# Patient Record
Sex: FEMALE | URBAN - METROPOLITAN AREA
[De-identification: names, ages, dates, MRNs, and addresses within clinical notes are randomized per-mention and may not be internally consistent; named-entity substitution may affect disease eponyms.]

---

## 2017-06-24 ENCOUNTER — EMERGENCY (EMERGENCY)
Facility: HOSPITAL | Age: 52
LOS: 1 days | Discharge: HOME | End: 2017-06-24
Attending: EMERGENCY MEDICINE | Admitting: EMERGENCY MEDICINE
Payer: COMMERCIAL

## 2017-06-24 VITALS
SYSTOLIC BLOOD PRESSURE: 118 MMHG | TEMPERATURE: 99 F | RESPIRATION RATE: 16 BRPM | OXYGEN SATURATION: 97 % | DIASTOLIC BLOOD PRESSURE: 72 MMHG | HEART RATE: 89 BPM

## 2017-06-24 VITALS
HEART RATE: 84 BPM | DIASTOLIC BLOOD PRESSURE: 72 MMHG | OXYGEN SATURATION: 99 % | SYSTOLIC BLOOD PRESSURE: 118 MMHG | WEIGHT: 130.07 LBS | TEMPERATURE: 98 F | RESPIRATION RATE: 15 BRPM | HEIGHT: 64 IN

## 2017-06-24 DIAGNOSIS — Z98.891 HISTORY OF UTERINE SCAR FROM PREVIOUS SURGERY: Chronic | ICD-10-CM

## 2017-06-24 DIAGNOSIS — Z87.39 PERSONAL HISTORY OF OTHER DISEASES OF THE MUSCULOSKELETAL SYSTEM AND CONNECTIVE TISSUE: Chronic | ICD-10-CM

## 2017-06-24 LAB
ALBUMIN SERPL ELPH-MCNC: 4.3 G/DL — SIGNIFICANT CHANGE UP (ref 3.3–5)
ALP SERPL-CCNC: 60 U/L — SIGNIFICANT CHANGE UP (ref 30–120)
ALT FLD-CCNC: 18 U/L DA — SIGNIFICANT CHANGE UP (ref 10–60)
AMYLASE P1 CFR SERPL: 48 U/L — SIGNIFICANT CHANGE UP (ref 25–125)
ANION GAP SERPL CALC-SCNC: 12 MMOL/L — SIGNIFICANT CHANGE UP (ref 5–17)
APTT BLD: 27.4 SEC — LOW (ref 27.5–37.4)
AST SERPL-CCNC: 16 U/L — SIGNIFICANT CHANGE UP (ref 10–40)
BASOPHILS # BLD AUTO: 0.1 K/UL — SIGNIFICANT CHANGE UP (ref 0–0.2)
BASOPHILS NFR BLD AUTO: 1.3 % — SIGNIFICANT CHANGE UP (ref 0–2)
BILIRUB DIRECT SERPL-MCNC: 0.2 MG/DL — SIGNIFICANT CHANGE UP (ref 0–0.2)
BILIRUB INDIRECT FLD-MCNC: 0.6 MG/DL — SIGNIFICANT CHANGE UP (ref 0.2–1)
BILIRUB SERPL-MCNC: 0.8 MG/DL — SIGNIFICANT CHANGE UP (ref 0.2–1.2)
BUN SERPL-MCNC: 12 MG/DL — SIGNIFICANT CHANGE UP (ref 7–23)
CALCIUM SERPL-MCNC: 10 MG/DL — SIGNIFICANT CHANGE UP (ref 8.4–10.5)
CHLORIDE SERPL-SCNC: 102 MMOL/L — SIGNIFICANT CHANGE UP (ref 96–108)
CO2 SERPL-SCNC: 26 MMOL/L — SIGNIFICANT CHANGE UP (ref 22–31)
CREAT SERPL-MCNC: 0.78 MG/DL — SIGNIFICANT CHANGE UP (ref 0.5–1.3)
EOSINOPHIL # BLD AUTO: 0.1 K/UL — SIGNIFICANT CHANGE UP (ref 0–0.5)
EOSINOPHIL NFR BLD AUTO: 0.7 % — SIGNIFICANT CHANGE UP (ref 0–6)
GLUCOSE SERPL-MCNC: 84 MG/DL — SIGNIFICANT CHANGE UP (ref 70–99)
HCG SERPL-ACNC: 1 MIU/ML — SIGNIFICANT CHANGE UP
HCT VFR BLD CALC: 41.1 % — SIGNIFICANT CHANGE UP (ref 34.5–45)
HGB BLD-MCNC: 14 G/DL — SIGNIFICANT CHANGE UP (ref 11.5–15.5)
INR BLD: 1.04 RATIO — SIGNIFICANT CHANGE UP (ref 0.88–1.16)
LIDOCAIN IGE QN: 146 U/L — SIGNIFICANT CHANGE UP (ref 73–393)
LYMPHOCYTES # BLD AUTO: 2 K/UL — SIGNIFICANT CHANGE UP (ref 1–3.3)
LYMPHOCYTES # BLD AUTO: 27.1 % — SIGNIFICANT CHANGE UP (ref 13–44)
MCHC RBC-ENTMCNC: 31.7 PG — SIGNIFICANT CHANGE UP (ref 27–34)
MCHC RBC-ENTMCNC: 34.1 GM/DL — SIGNIFICANT CHANGE UP (ref 32–36)
MCV RBC AUTO: 92.9 FL — SIGNIFICANT CHANGE UP (ref 80–100)
MONOCYTES # BLD AUTO: 0.6 K/UL — SIGNIFICANT CHANGE UP (ref 0–0.9)
MONOCYTES NFR BLD AUTO: 8.9 % — SIGNIFICANT CHANGE UP (ref 2–14)
NEUTROPHILS # BLD AUTO: 4.5 K/UL — SIGNIFICANT CHANGE UP (ref 1.8–7.4)
NEUTROPHILS NFR BLD AUTO: 61.9 % — SIGNIFICANT CHANGE UP (ref 43–77)
PLATELET # BLD AUTO: 173 K/UL — SIGNIFICANT CHANGE UP (ref 150–400)
POTASSIUM SERPL-MCNC: 3.8 MMOL/L — SIGNIFICANT CHANGE UP (ref 3.5–5.3)
POTASSIUM SERPL-SCNC: 3.8 MMOL/L — SIGNIFICANT CHANGE UP (ref 3.5–5.3)
PROT SERPL-MCNC: 7 G/DL — SIGNIFICANT CHANGE UP (ref 6–8.3)
PROTHROM AB SERPL-ACNC: 11.3 SEC — SIGNIFICANT CHANGE UP (ref 9.8–12.7)
RBC # BLD: 4.43 M/UL — SIGNIFICANT CHANGE UP (ref 3.8–5.2)
RBC # FLD: 11.3 % — SIGNIFICANT CHANGE UP (ref 10.3–14.5)
SODIUM SERPL-SCNC: 140 MMOL/L — SIGNIFICANT CHANGE UP (ref 135–145)
WBC # BLD: 7.2 K/UL — SIGNIFICANT CHANGE UP (ref 3.8–10.5)
WBC # FLD AUTO: 7.2 K/UL — SIGNIFICANT CHANGE UP (ref 3.8–10.5)

## 2017-06-24 PROCEDURE — 85730 THROMBOPLASTIN TIME PARTIAL: CPT

## 2017-06-24 PROCEDURE — 99284 EMERGENCY DEPT VISIT MOD MDM: CPT | Mod: 25

## 2017-06-24 PROCEDURE — 76700 US EXAM ABDOM COMPLETE: CPT

## 2017-06-24 PROCEDURE — 96375 TX/PRO/DX INJ NEW DRUG ADDON: CPT

## 2017-06-24 PROCEDURE — 76700 US EXAM ABDOM COMPLETE: CPT | Mod: 26

## 2017-06-24 PROCEDURE — 76705 ECHO EXAM OF ABDOMEN: CPT

## 2017-06-24 PROCEDURE — 96374 THER/PROPH/DIAG INJ IV PUSH: CPT

## 2017-06-24 PROCEDURE — 36415 COLL VENOUS BLD VENIPUNCTURE: CPT

## 2017-06-24 PROCEDURE — 93005 ELECTROCARDIOGRAM TRACING: CPT

## 2017-06-24 PROCEDURE — 80048 BASIC METABOLIC PNL TOTAL CA: CPT

## 2017-06-24 PROCEDURE — 76705 ECHO EXAM OF ABDOMEN: CPT | Mod: 26

## 2017-06-24 PROCEDURE — 82150 ASSAY OF AMYLASE: CPT

## 2017-06-24 PROCEDURE — 85027 COMPLETE CBC AUTOMATED: CPT

## 2017-06-24 PROCEDURE — 80076 HEPATIC FUNCTION PANEL: CPT

## 2017-06-24 PROCEDURE — 83690 ASSAY OF LIPASE: CPT

## 2017-06-24 PROCEDURE — 99285 EMERGENCY DEPT VISIT HI MDM: CPT

## 2017-06-24 PROCEDURE — 84702 CHORIONIC GONADOTROPIN TEST: CPT

## 2017-06-24 PROCEDURE — 93010 ELECTROCARDIOGRAM REPORT: CPT

## 2017-06-24 PROCEDURE — 85610 PROTHROMBIN TIME: CPT

## 2017-06-24 RX ORDER — FAMOTIDINE 10 MG/ML
20 INJECTION INTRAVENOUS ONCE
Refills: 0 | Status: COMPLETED | OUTPATIENT
Start: 2017-06-24 | End: 2017-06-24

## 2017-06-24 RX ORDER — FAMOTIDINE 10 MG/ML
1 INJECTION INTRAVENOUS
Qty: 14 | Refills: 0
Start: 2017-06-24 | End: 2017-07-01

## 2017-06-24 RX ORDER — SODIUM CHLORIDE 9 MG/ML
1000 INJECTION INTRAMUSCULAR; INTRAVENOUS; SUBCUTANEOUS ONCE
Refills: 0 | Status: COMPLETED | OUTPATIENT
Start: 2017-06-24 | End: 2017-06-24

## 2017-06-24 RX ORDER — ONDANSETRON 8 MG/1
4 TABLET, FILM COATED ORAL ONCE
Refills: 0 | Status: COMPLETED | OUTPATIENT
Start: 2017-06-24 | End: 2017-06-24

## 2017-06-24 RX ORDER — ONDANSETRON 8 MG/1
1 TABLET, FILM COATED ORAL
Qty: 20 | Refills: 0
Start: 2017-06-24 | End: 2017-06-29

## 2017-06-24 RX ADMIN — ONDANSETRON 4 MILLIGRAM(S): 8 TABLET, FILM COATED ORAL at 13:07

## 2017-06-24 RX ADMIN — FAMOTIDINE 20 MILLIGRAM(S): 10 INJECTION INTRAVENOUS at 13:59

## 2017-06-24 RX ADMIN — SODIUM CHLORIDE 1000 MILLILITER(S): 9 INJECTION INTRAMUSCULAR; INTRAVENOUS; SUBCUTANEOUS at 13:07

## 2017-06-24 NOTE — ED PROVIDER NOTE - OBJECTIVE STATEMENT
50yo F c/o dull achy epigastric/RUQ pain x 4 days, constant, but waxes and wanes. +mild nausea and diarrhea  x1 in morning only for 3 days. No fever, chills, back pain, trauma, travel, sick contacts, cp, sob, bladder changes, vag bleed or discharge.  pt taking tylenol without relief.  pain 5/10    prior   no PMD  Gyn=[facility] ([place])  on hormones (patch) for menopause

## 2017-06-24 NOTE — ED ADULT NURSE NOTE - CHPI ED SYMPTOMS NEG
no abdominal distension/no blood in stool/no dysuria/no fever/no hematuria/no burning urination/no chills

## 2017-06-24 NOTE — ED ADULT NURSE NOTE - OBJECTIVE STATEMENT
Pt complains of epigastric pain constantly for 3 days. States pain is dull in nature. States vomited x 1 the first day. States dark brown watery diarrhea. No fever. Abdomen soft non tender. Positive bowel sounds noted. Epigastric tenderness noted

## 2017-06-24 NOTE — ED PROVIDER NOTE - PROGRESS NOTE DETAILS
pt returns from [facility], st feels better, no c/o. NAD pt returns from [facility], st feels better, no c/o. NAD.  pt st never had colonscopy, advised needs to have one performed and EGD as well. agrees with follow up with Dr. Mcdowell [facility] for GI patient copies of all available lab results. pt denies pregnant at this time. d/w dr sameer jones (GI), aware of pt status, will see patient in office in 2 days.

## 2021-08-23 NOTE — ED ADULT NURSE NOTE - CAS DISCH ACCOMP BY
Medication(s) Requested:   Disp Refills Start End     clonazePAM (KlonoPIN) 1 MG tablet 120 tablet 0 7/28/2021     Sig: Do not start before July 28, 2021. TAKE 1 TABLET BY  MOUTH FOUR TIMES DAILY AS NEEDED FOR ANXIETY.MUST LAST 30 DAYS.    Sent to pharmacy as: clonazePAM 1 MG Oral Tablet (KlonoPIN)    Class: Eprescribe    Notes to Pharmacy: FILL DATE 7/28/21. MUST LAST 30 DAYS. DO NOT FILL EARLY.    E-Prescribing Status: Receipt confirmed by pharmacy (7/21/2021 11:21 AM CDT)        Last appointment: 04/19/2021  Advised follow up: 4 weeks, 1 provider cancel, 1 no show  Appointment: 09/14/2021  Last refill: 07/28/2021 per PDMP  Is the patient due for refill of this medication(s): Yes, fill date 08/27/2021  PDMP review: Criteria not met because Opioid Daily Dose and Opioid-Benzodiazepine Concurrence for the Past 100 Days.  Forwarded to Physician/SHANTE for further review and decision on medication(s) requested.   [facility] [address] [place]         Spouse

## 2022-10-16 ENCOUNTER — RX ONLY (RX ONLY)
Age: 57
End: 2022-10-16

## 2022-10-16 ENCOUNTER — OFFICE (OUTPATIENT)
Dept: URBAN - METROPOLITAN AREA CLINIC 92 | Facility: CLINIC | Age: 57
Setting detail: OPHTHALMOLOGY
End: 2022-10-16
Payer: COMMERCIAL

## 2022-10-16 DIAGNOSIS — H35.433: ICD-10-CM

## 2022-10-16 DIAGNOSIS — H25.093: ICD-10-CM

## 2022-10-16 DIAGNOSIS — H01.004: ICD-10-CM

## 2022-10-16 DIAGNOSIS — H01.001: ICD-10-CM

## 2022-10-16 DIAGNOSIS — H43.393: ICD-10-CM

## 2022-10-16 PROBLEM — H25.091: Status: ACTIVE | Noted: 2022-10-16

## 2022-10-16 PROBLEM — H25.092: Status: ACTIVE | Noted: 2022-10-16

## 2022-10-16 PROBLEM — H35.432 PAVING STONE DEGENERATION; RIGHT EYE, LEFT EYE, BOTH EYES: Status: ACTIVE | Noted: 2022-10-16

## 2022-10-16 PROBLEM — H35.431 PAVING STONE DEGENERATION; RIGHT EYE, LEFT EYE, BOTH EYES: Status: ACTIVE | Noted: 2022-10-16

## 2022-10-16 PROCEDURE — 92004 COMPRE OPH EXAM NEW PT 1/>: CPT | Performed by: OPHTHALMOLOGY

## 2022-10-16 PROCEDURE — 92250 FUNDUS PHOTOGRAPHY W/I&R: CPT | Performed by: OPHTHALMOLOGY

## 2022-10-16 ASSESSMENT — REFRACTION_CURRENTRX
OD_AXIS: 53
OS_AXIS: 119
OS_SPHERE: -6.50
OD_SPHERE: -6.50
OD_OVR_VA: 20/
OD_CYLINDER: -0.50
OS_OVR_VA: 20/
OS_CYLINDER: -0.75

## 2022-10-16 ASSESSMENT — LID EXAM ASSESSMENTS
OS_BLEPHARITIS: LUL 1+
OD_BLEPHARITIS: RUL 1+

## 2022-10-16 ASSESSMENT — VISUAL ACUITY
OS_BCVA: 20/100-2
OD_BCVA: 20/80-

## 2022-10-16 ASSESSMENT — TONOMETRY
OS_IOP_MMHG: 17
OD_IOP_MMHG: 18

## 2022-10-16 ASSESSMENT — CONFRONTATIONAL VISUAL FIELD TEST (CVF)
OD_FINDINGS: FULL
OS_FINDINGS: FULL

## 2023-02-17 NOTE — ED PROVIDER NOTE - AREA
CALL RETURN FORM   Reason for patient call? Fozia calling from [facility] stating that patient needs IV hydration order for an hour prior and four hours post CT on 7/28/23 at 11:30AM    Patient's primary oncologist? Dr Krishan Chowdary    Name of person the patient was calling for? [facility]    Any additional information to add, if applicable? N/A   Informed patient that the message will be forwarded to the team and someone will get back to them as soon as possible    Did you relay this information to the patient? Rebecca Bunn can be reached back at [phone] or [phone]  generalized

## 2023-06-30 ENCOUNTER — OFFICE (OUTPATIENT)
Dept: URBAN - METROPOLITAN AREA CLINIC 92 | Facility: CLINIC | Age: 58
Setting detail: OPHTHALMOLOGY
End: 2023-06-30
Payer: COMMERCIAL

## 2023-06-30 DIAGNOSIS — H01.001: ICD-10-CM

## 2023-06-30 DIAGNOSIS — H01.004: ICD-10-CM

## 2023-06-30 DIAGNOSIS — H35.433: ICD-10-CM

## 2023-06-30 PROBLEM — Z96.1 PSEUDOPHAKIA: Status: ACTIVE | Noted: 2023-06-30

## 2023-06-30 PROBLEM — H26.40 POSTERIOR CAPSULAR OPACIFICATION: Status: ACTIVE | Noted: 2023-06-30

## 2023-06-30 PROCEDURE — 92202 OPSCPY EXTND ON/MAC DRAW: CPT | Performed by: OPHTHALMOLOGY

## 2023-06-30 PROCEDURE — 92014 COMPRE OPH EXAM EST PT 1/>: CPT | Performed by: OPHTHALMOLOGY

## 2023-06-30 PROCEDURE — 92134 CPTRZ OPH DX IMG PST SGM RTA: CPT | Performed by: OPHTHALMOLOGY

## 2023-06-30 ASSESSMENT — TONOMETRY
OS_IOP_MMHG: 17
OD_IOP_MMHG: 17

## 2023-06-30 ASSESSMENT — REFRACTION_CURRENTRX
OD_OVR_VA: 20/
OD_SPHERE: -6.50
OS_SPHERE: -6.50
OS_AXIS: 119
OD_AXIS: 53
OD_CYLINDER: -0.50
OS_OVR_VA: 20/
OS_CYLINDER: -0.75

## 2023-06-30 ASSESSMENT — SPHEQUIV_DERIVED
OS_SPHEQUIV: -10.625
OD_SPHEQUIV: -0.125

## 2023-06-30 ASSESSMENT — REFRACTION_AUTOREFRACTION
OS_SPHERE: -11.25
OD_AXIS: 116
OD_CYLINDER: +1.25
OD_SPHERE: -0.75
OS_CYLINDER: +1.25
OS_AXIS: 024

## 2023-06-30 ASSESSMENT — LID EXAM ASSESSMENTS
OS_BLEPHARITIS: LUL 1+
OD_BLEPHARITIS: RUL 1+

## 2023-06-30 ASSESSMENT — VISUAL ACUITY
OD_BCVA: 20/60-1
OS_BCVA: 20/20-2

## 2023-06-30 ASSESSMENT — CONFRONTATIONAL VISUAL FIELD TEST (CVF)
OD_FINDINGS: FULL
OS_FINDINGS: FULL

## 2023-10-02 RX ORDER — ESTRADIOL AND NORETHINDRONE ACETATE .5; .1 MG/1; MG/1
1 TABLET, FILM COATED ORAL
Qty: 0 | Refills: 0 | DISCHARGE
